# Patient Record
Sex: MALE | Race: WHITE | HISPANIC OR LATINO
[De-identification: names, ages, dates, MRNs, and addresses within clinical notes are randomized per-mention and may not be internally consistent; named-entity substitution may affect disease eponyms.]

---

## 2019-07-08 ENCOUNTER — APPOINTMENT (OUTPATIENT)
Dept: GASTROENTEROLOGY | Facility: CLINIC | Age: 49
End: 2019-07-08
Payer: COMMERCIAL

## 2019-07-08 VITALS
HEART RATE: 68 BPM | SYSTOLIC BLOOD PRESSURE: 130 MMHG | OXYGEN SATURATION: 96 % | WEIGHT: 315 LBS | TEMPERATURE: 98.6 F | BODY MASS INDEX: 47.74 KG/M2 | HEIGHT: 68 IN | DIASTOLIC BLOOD PRESSURE: 80 MMHG

## 2019-07-08 DIAGNOSIS — Z82.0 FAMILY HISTORY OF EPILEPSY AND OTHER DISEASES OF THE NERVOUS SYSTEM: ICD-10-CM

## 2019-07-08 DIAGNOSIS — Z87.442 PERSONAL HISTORY OF URINARY CALCULI: ICD-10-CM

## 2019-07-08 DIAGNOSIS — R10.2 PELVIC AND PERINEAL PAIN: ICD-10-CM

## 2019-07-08 DIAGNOSIS — Z82.49 FAMILY HISTORY OF ISCHEMIC HEART DISEASE AND OTHER DISEASES OF THE CIRCULATORY SYSTEM: ICD-10-CM

## 2019-07-08 DIAGNOSIS — Z83.3 FAMILY HISTORY OF DIABETES MELLITUS: ICD-10-CM

## 2019-07-08 DIAGNOSIS — I71.2 THORACIC AORTIC ANEURYSM, W/OUT RUPTURE: ICD-10-CM

## 2019-07-08 DIAGNOSIS — Z80.0 FAMILY HISTORY OF MALIGNANT NEOPLASM OF DIGESTIVE ORGANS: ICD-10-CM

## 2019-07-08 DIAGNOSIS — Z87.891 PERSONAL HISTORY OF NICOTINE DEPENDENCE: ICD-10-CM

## 2019-07-08 DIAGNOSIS — R19.5 OTHER FECAL ABNORMALITIES: ICD-10-CM

## 2019-07-08 PROCEDURE — 82270 OCCULT BLOOD FECES: CPT

## 2019-07-08 PROCEDURE — 99204 OFFICE O/P NEW MOD 45 MIN: CPT | Mod: 25

## 2019-07-08 RX ORDER — CALCIUM CARBONATE/VITAMIN D3 600 MG-10
TABLET ORAL
Refills: 0 | Status: ACTIVE | COMMUNITY

## 2019-07-08 NOTE — REVIEW OF SYSTEMS
[Recent Weight Loss (___ Lbs)] : recent [unfilled] ~Ulb weight loss [Shortness Of Breath] : shortness of breath [As Noted in HPI] : as noted in HPI [Negative] : Heme/Lymph

## 2019-07-08 NOTE — REASON FOR VISIT
[Initial Evaluation] : an initial evaluation [FreeTextEntry1] : h/o polyps, loose stools, family h/o colon cancer, pain in perineum

## 2019-07-08 NOTE — PHYSICAL EXAM
[General Appearance - Alert] : alert [General Appearance - In No Acute Distress] : in no acute distress [Neck Appearance] : the appearance of the neck was normal [Thyroid Diffuse Enlargement] : the thyroid was not enlarged [Neck Cervical Mass (___cm)] : no neck mass was observed [Jugular Venous Distention Increased] : there was no jugular-venous distention [Thyroid Nodule] : there were no palpable thyroid nodules [Heart Rate And Rhythm] : heart rate was normal and rhythm regular [Heart Sounds] : normal S1 and S2 [Auscultation Breath Sounds / Voice Sounds] : lungs were clear to auscultation bilaterally [Heart Sounds Gallop] : no gallops [Murmurs] : no murmurs [Bowel Sounds] : normal bowel sounds [Edema] : there was no peripheral edema [Heart Sounds Pericardial Friction Rub] : no pericardial rub [Abdomen Tenderness] : non-tender [] : no hepato-splenomegaly [Abdomen Soft] : soft [Normal Sphincter Tone] : normal sphincter tone [Abdomen Mass (___ Cm)] : no abdominal mass palpated [No Rectal Mass] : no rectal mass [No CVA Tenderness] : no ~M costovertebral angle tenderness [No Spinal Tenderness] : no spinal tenderness [Oriented To Time, Place, And Person] : oriented to person, place, and time [Impaired Insight] : insight and judgment were intact [Affect] : the affect was normal [Occult Blood Positive] : stool was negative for occult blood [FreeTextEntry1] : no perineal lesion/abscess noted

## 2019-07-08 NOTE — ASSESSMENT
[FreeTextEntry1] : Patient with a history of colonic polyps and a father with colon cancer. He has been having loose stools. He complains of pain in the paramedian although nothing is seen on physical exam.\par \par A colonoscopy has been scheduled. The risks, benefits, alternatives, and limitations of the procedure, including the possibility of missed lesions, were explained. The patient will require anesthesia evaluation prior to the procedure given his BMI of 50.18.

## 2019-07-08 NOTE — CONSULT LETTER
[FreeTextEntry1] : Dear Dr. Tate García,\par \par I had the pleasure of seeing your patient NEVA WOODY in the office today.  My office note is attached.\par \par Thank you very much for allowing me to participate in the care of your patient.\par \par Sincerely,\par \par Jose Oviedo M.D., FAC, FACP\par Director, Celiac Program at [facility]\par  of Medicine\par [employer] at [facility] Practice Director,\par [facility] - Gastroenterology/Internal Medicine at [place]\[facility] [address]\[place] [address]\par Tel: [phone]\par Email: [email or web address] \par \[facility] The attached note has been created using a voice recognition system (Dragon).  There may be some misspellings and typos.  Please call my office if you have any issues or questions.

## 2019-07-08 NOTE — HISTORY OF PRESENT ILLNESS
[FreeTextEntry1] : The patient is a 48-year-old man with a history of colonic polyps and a father with colon cancer. He has been diagnosed with IBS in the past. He states that for the past 2 weeks, he has had loose stools about twice a day. He denies melena or bright or blood per rectum. He denies abdominal pain. Of note, the patient has been on Nutrisystem for the past 2 months. He denies heartburn, dysphagia, nausea, or vomiting. His weight fluctuates having had lost 57 pounds and gaining 20 and now losing 5. The patient also complains of irritation in his perineum with swelling for which he uses Preparation H to control it. He also sees intermittent bleeding. The patient has not been hospitalized in the past year and denies any cardiac issues other than a sending aortic aneurysm for which he is followed regularly by cardiology and has been told is stable.

## 2019-07-11 ENCOUNTER — APPOINTMENT (OUTPATIENT)
Dept: GASTROENTEROLOGY | Facility: AMBULATORY MEDICAL SERVICES | Age: 49
End: 2019-07-11
Payer: COMMERCIAL

## 2019-07-11 PROCEDURE — 45380 COLONOSCOPY AND BIOPSY: CPT

## 2020-01-15 ENCOUNTER — OTHER (OUTPATIENT)
Age: 50
End: 2020-01-15

## 2020-09-08 ENCOUNTER — APPOINTMENT (OUTPATIENT)
Dept: GASTROENTEROLOGY | Facility: CLINIC | Age: 50
End: 2020-09-08
Payer: COMMERCIAL

## 2020-09-08 VITALS
HEIGHT: 68 IN | OXYGEN SATURATION: 97 % | SYSTOLIC BLOOD PRESSURE: 130 MMHG | WEIGHT: 315 LBS | DIASTOLIC BLOOD PRESSURE: 80 MMHG | HEART RATE: 65 BPM | TEMPERATURE: 96 F | BODY MASS INDEX: 47.74 KG/M2

## 2020-09-08 DIAGNOSIS — Z86.79 PERSONAL HISTORY OF OTHER DISEASES OF THE CIRCULATORY SYSTEM: ICD-10-CM

## 2020-09-08 DIAGNOSIS — E66.9 OBESITY, UNSPECIFIED: ICD-10-CM

## 2020-09-08 DIAGNOSIS — Z01.818 ENCOUNTER FOR OTHER PREPROCEDURAL EXAMINATION: ICD-10-CM

## 2020-09-08 PROCEDURE — 99214 OFFICE O/P EST MOD 30 MIN: CPT

## 2020-09-08 RX ORDER — MULTIVITAMIN
TABLET ORAL
Refills: 0 | Status: ACTIVE | COMMUNITY

## 2020-09-08 RX ORDER — ASPIRIN 81 MG/1
81 TABLET, COATED ORAL
Refills: 0 | Status: DISCONTINUED | COMMUNITY
End: 2020-09-08

## 2020-09-08 RX ORDER — SODIUM SULFATE, POTASSIUM SULFATE, MAGNESIUM SULFATE 17.5; 3.13; 1.6 G/ML; G/ML; G/ML
17.5-3.13-1.6 SOLUTION, CONCENTRATE ORAL
Qty: 1 | Refills: 0 | Status: DISCONTINUED | COMMUNITY
Start: 2019-07-08 | End: 2020-09-08

## 2020-09-08 NOTE — PHYSICAL EXAM
[General Appearance - Alert] : alert [Neck Cervical Mass (___cm)] : no neck mass was observed [Neck Appearance] : the appearance of the neck was normal [General Appearance - In No Acute Distress] : in no acute distress [Thyroid Nodule] : there were no palpable thyroid nodules [Jugular Venous Distention Increased] : there was no jugular-venous distention [Thyroid Diffuse Enlargement] : the thyroid was not enlarged [Auscultation Breath Sounds / Voice Sounds] : lungs were clear to auscultation bilaterally [Heart Sounds Gallop] : no gallops [Heart Sounds] : normal S1 and S2 [Heart Rate And Rhythm] : heart rate was normal and rhythm regular [Heart Sounds Pericardial Friction Rub] : no pericardial rub [Murmurs] : no murmurs [Edema] : there was no peripheral edema [Bowel Sounds] : normal bowel sounds [Abdomen Soft] : soft [Abdomen Tenderness] : non-tender [] : no hepato-splenomegaly [Abdomen Mass (___ Cm)] : no abdominal mass palpated [No CVA Tenderness] : no ~M costovertebral angle tenderness [Oriented To Time, Place, And Person] : oriented to person, place, and time [No Spinal Tenderness] : no spinal tenderness [Impaired Insight] : insight and judgment were intact [Affect] : the affect was normal [FreeTextEntry1] : obese

## 2020-09-08 NOTE — ASSESSMENT
[FreeTextEntry1] : Patient who is undergoing preoperative evaluation for bariatric surgery.  He will require an endoscopy.  Colonoscopy was significant for mild diverticulosis.\par \par An EGD has been scheduled. The risks, benefits, alternatives, and limitations of the procedure were explained.  The patient will require preoperative anesthesia evaluation due to his BMI of 54.13.\par \par Information was given to the patient regarding diverticulosis and a high-fiber diet.\par \par Patient is due for colonoscopy in July 2022 for his history of colonic polyps and father with colon cancer.\par \par \par Plan from 7/8/2019 - Patient with a history of colonic polyps and a father with colon cancer. He has been having loose stools. He complains of pain in the paramedian although nothing is seen on physical exam.\par \par A colonoscopy has been scheduled. The risks, benefits, alternatives, and limitations of the procedure, including the possibility of missed lesions, were explained. The patient will require anesthesia evaluation prior to the procedure given his BMI of 50.18.

## 2020-09-08 NOTE — CONSULT LETTER
[FreeTextEntry1] : Dear Dr. Tate García,\Banner \Banner I had the pleasure of seeing your patient NEVA WOODY in the office today.  My office note is attached. PLEASE READ THE "ASSESSMENT" SECTION OF THE NOTE TO SEE MY IMPRESSION AND PLAN.\par \Banner Thank you very much for allowing me to participate in the care of your patient.\Banner \Banner Sincerely,\Banner \Banner Jose Oviedo M.D., FAC, [facility]anner Director, Celiac Program at [facility]  of Medicine\[facility] [employer] at [facility] Practice Director,[facility] - Gastroenterology/Internal Medicine at [place]\[facility] [address]\[place], [place] [address] Tel: [phone]\Banner Email: [email or web address] \Banner \Banner The attached note has been created using a voice recognition system (Dragon).  There may be some misspellings and typos.  Please call my office if you have any issues or questions.

## 2020-09-08 NOTE — HISTORY OF PRESENT ILLNESS
[FreeTextEntry1] : The patient is seen for the first time since his colonoscopy performed on July 11, 2019.  We reviewed the procedure which was significant for nodular mucosa in the terminal ileum with negative biopsies.  The patient has mild sigmoid diverticulosis.  Random right and left colonic biopsies are negative.  The patient now presents because he is planning on going for bariatric surgery and needs a preoperative endoscopy.  He denies abdominal pain.  He gets infrequent heartburn, approximately once a month for which he takes occasional Tums.  He denies dysphasia.  He has 2 bowel movements a day which are mostly solid but sometimes soft.  He denies melena or bright red blood per rectum.  The patient has gained 26 pounds since July 2019.  His BMI is now 54.13.  The patient has not been admitted to the hospital in the past year and denies any cardiac issues.\par \par \par Note from 7/8/2019 - The patient is a 48-year-old man with a history of colonic polyps and a father with colon cancer. He has been diagnosed with IBS in the past. He states that for the past 2 weeks, he has had loose stools about twice a day. He denies melena or bright or blood per rectum. He denies abdominal pain. Of note, the patient has been on Nutrisystem for the past 2 months. He denies heartburn, dysphagia, nausea, or vomiting. His weight fluctuates having had lost 57 pounds and gaining 20 and now losing 5. The patient also complains of irritation in his perineum with swelling for which he uses Preparation H to control it. He also sees intermittent bleeding. The patient has not been hospitalized in the past year and denies any cardiac issues other than a sending aortic aneurysm for which he is followed regularly by cardiology and has been told is stable.

## 2020-09-18 ENCOUNTER — APPOINTMENT (OUTPATIENT)
Dept: DISASTER EMERGENCY | Facility: CLINIC | Age: 50
End: 2020-09-18

## 2020-09-18 ENCOUNTER — TRANSCRIPTION ENCOUNTER (OUTPATIENT)
Age: 50
End: 2020-09-18

## 2020-09-18 DIAGNOSIS — Z01.818 ENCOUNTER FOR OTHER PREPROCEDURAL EXAMINATION: ICD-10-CM

## 2020-09-19 LAB — SARS-COV-2 N GENE NPH QL NAA+PROBE: NOT DETECTED

## 2020-09-23 ENCOUNTER — APPOINTMENT (OUTPATIENT)
Dept: GASTROENTEROLOGY | Facility: AMBULATORY MEDICAL SERVICES | Age: 50
End: 2020-09-23
Payer: COMMERCIAL

## 2020-09-23 PROCEDURE — 43239 EGD BIOPSY SINGLE/MULTIPLE: CPT

## 2020-11-19 ENCOUNTER — APPOINTMENT (OUTPATIENT)
Dept: GASTROENTEROLOGY | Facility: CLINIC | Age: 50
End: 2020-11-19
Payer: COMMERCIAL

## 2020-11-19 DIAGNOSIS — K31.7 POLYP OF STOMACH AND DUODENUM: ICD-10-CM

## 2020-11-19 DIAGNOSIS — K21.00 GASTRO-ESOPHAGEAL REFLUX DISEASE WITH ESOPHAGITIS, WITHOUT BLEEDING: ICD-10-CM

## 2020-11-19 PROCEDURE — 99213 OFFICE O/P EST LOW 20 MIN: CPT | Mod: 95

## 2020-11-22 NOTE — CONSULT LETTER
[FreeTextEntry1] : Dear Dr. Tate García and Dr. Jose Pocne,\par \par I had the pleasure of seeing your patient NEVA WOODY in the office today.  My office note is attached. PLEASE READ THE "ASSESSMENT" SECTION OF THE NOTE TO SEE MY IMPRESSION AND PLAN.\par \par Thank you very much for allowing me to participate in the care of your patient.\par \par Sincerely,\par \par Jose Oviedo M.D., FACG, FACP\par Director, Celiac Program at [facility]  of Medicine\par [employer] at [facility] [place] Practice Director,\[facility] - Gastroenterology/Internal Medicine at [place]\[address]\[place] [address]\par Tel: [phone]\par Email: [email or web address]\par \par \[facility] The attached note has been created using a voice recognition system (Dragon).  There may be some misspellings and typos.  Please call my office if you have any issues or questions.

## 2020-11-22 NOTE — ASSESSMENT
[FreeTextEntry1] : Patient with benign gastric polyps and biopsy evidence of reflux esophagitis on endoscopy.  He only has rare heartburn but otherwise feels well.\par \par I discussed dietary and lifestyle modifications in the treatment of reflux.  The patient was also advised that he can use Tums as needed.\par \par There is no GI contraindication to bariatric surgery.\par \par Patient is due for colonoscopy in July 2022 for his history of adenomatous colonic polyps and a father with colon cancer.\par \par \par Plan from 9/8/2020 - Patient who is undergoing preoperative evaluation for bariatric surgery.  He will require an endoscopy.  Colonoscopy was significant for mild diverticulosis.\par \par An EGD has been scheduled. The risks, benefits, alternatives, and limitations of the procedure were explained.  The patient will require preoperative anesthesia evaluation due to his BMI of 54.13.\par \par Information was given to the patient regarding diverticulosis and a high-fiber diet.\par \par Patient is due for colonoscopy in July 2022 for his history of colonic polyps and father with colon cancer.\par \par \par Plan from 7/8/2019 - Patient with a history of colonic polyps and a father with colon cancer. He has been having loose stools. He complains of pain in the paramedian although nothing is seen on physical exam.\par \par A colonoscopy has been scheduled. The risks, benefits, alternatives, and limitations of the procedure, including the possibility of missed lesions, were explained. The patient will require anesthesia evaluation prior to the procedure given his BMI of 50.18.

## 2020-11-22 NOTE — HISTORY OF PRESENT ILLNESS
[Home] : at home, [unfilled] , at the time of the visit. [Medical Office: ([facility])___] : at the medical office located in  [Verbal consent obtained from patient] : the patient, [unfilled] [FreeTextEntry1] : We reviewed the patient's EGD performed on September 23, 2020.  The exam was significant for benign fundic gland polyps in the gastric body along with biopsy showing evidence of reflux esophagitis.  The patient gets rare heartburn sometimes at night.  He denies abdominal pain.  Bowel movements are normal with no melena or bright red blood per rectum.  He has bariatric surgery scheduled for December 28.\par \par \par Note from 9/8/2020 - The patient is seen for the first time since his colonoscopy performed on July 11, 2019.  We reviewed the procedure which was significant for nodular mucosa in the terminal ileum with negative biopsies.  The patient has mild sigmoid diverticulosis.  Random right and left colonic biopsies are negative.  The patient now presents because he is planning on going for bariatric surgery and needs a preoperative endoscopy.  He denies abdominal pain.  He gets infrequent heartburn, approximately once a month for which he takes occasional Tums.  He denies dysphasia.  He has 2 bowel movements a day which are mostly solid but sometimes soft.  He denies melena or bright red blood per rectum.  The patient has gained 26 pounds since July 2019.  His BMI is now 54.13.  The patient has not been admitted to the hospital in the past year and denies any cardiac issues.\par \par \par Note from 7/8/2019 - The patient is a 48-year-old man with a history of colonic polyps and a father with colon cancer. He has been diagnosed with IBS in the past. He states that for the past 2 weeks, he has had loose stools about twice a day. He denies melena or bright or blood per rectum. He denies abdominal pain. Of note, the patient has been on Nutrisystem for the past 2 months. He denies heartburn, dysphagia, nausea, or vomiting. His weight fluctuates having had lost 57 pounds and gaining 20 and now losing 5. The patient also complains of irritation in his perineum with swelling for which he uses Preparation H to control it. He also sees intermittent bleeding. The patient has not been hospitalized in the past year and denies any cardiac issues other than a sending aortic aneurysm for which he is followed regularly by cardiology and has been told is stable.

## 2021-10-08 ENCOUNTER — TRANSCRIPTION ENCOUNTER (OUTPATIENT)
Age: 51
End: 2021-10-08

## 2021-10-28 ENCOUNTER — TRANSCRIPTION ENCOUNTER (OUTPATIENT)
Age: 51
End: 2021-10-28

## 2022-02-23 ENCOUNTER — TRANSCRIPTION ENCOUNTER (OUTPATIENT)
Age: 52
End: 2022-02-23

## 2022-03-10 ENCOUNTER — TRANSCRIPTION ENCOUNTER (OUTPATIENT)
Age: 52
End: 2022-03-10

## 2022-08-04 ENCOUNTER — APPOINTMENT (OUTPATIENT)
Dept: GASTROENTEROLOGY | Facility: CLINIC | Age: 52
End: 2022-08-04

## 2022-08-04 VITALS
DIASTOLIC BLOOD PRESSURE: 78 MMHG | WEIGHT: 279.2 LBS | HEIGHT: 68 IN | BODY MASS INDEX: 42.31 KG/M2 | SYSTOLIC BLOOD PRESSURE: 100 MMHG | HEART RATE: 60 BPM | OXYGEN SATURATION: 96 % | TEMPERATURE: 96.9 F

## 2022-08-04 DIAGNOSIS — R14.3 FLATULENCE: ICD-10-CM

## 2022-08-04 PROCEDURE — 99213 OFFICE O/P EST LOW 20 MIN: CPT

## 2022-08-04 RX ORDER — ASPIRIN 81 MG
81 TABLET, DELAYED RELEASE (ENTERIC COATED) ORAL
Refills: 0 | Status: DISCONTINUED | COMMUNITY
End: 2022-08-04

## 2022-08-04 RX ORDER — SERTRALINE HYDROCHLORIDE 100 MG/1
100 TABLET, FILM COATED ORAL
Qty: 90 | Refills: 0 | Status: ACTIVE | COMMUNITY
Start: 2022-02-14

## 2022-08-04 RX ORDER — SERTRALINE HYDROCHLORIDE 25 MG/1
TABLET, FILM COATED ORAL
Refills: 0 | Status: DISCONTINUED | COMMUNITY
End: 2022-08-04

## 2022-08-04 RX ORDER — LOSARTAN POTASSIUM 50 MG/1
50 TABLET, FILM COATED ORAL
Qty: 90 | Refills: 0 | Status: ACTIVE | COMMUNITY
Start: 2022-01-10

## 2022-08-04 RX ORDER — VALSARTAN 40 MG/1
TABLET, COATED ORAL
Refills: 0 | Status: DISCONTINUED | COMMUNITY
End: 2022-08-04

## 2022-08-04 RX ORDER — FENOFIBRATE 145 MG/1
145 TABLET, COATED ORAL
Refills: 0 | Status: DISCONTINUED | COMMUNITY
End: 2022-08-04

## 2022-08-04 RX ORDER — ROSUVASTATIN CALCIUM 5 MG/1
5 TABLET, FILM COATED ORAL
Qty: 90 | Refills: 0 | Status: ACTIVE | COMMUNITY
Start: 2021-10-08

## 2022-08-04 RX ORDER — MULTIVIT-MIN/IRON/FOLIC ACID/K 18-600-40
500 CAPSULE ORAL
Refills: 0 | Status: ACTIVE | COMMUNITY

## 2022-08-04 RX ORDER — SODIUM SULFATE, POTASSIUM SULFATE, MAGNESIUM SULFATE 17.5; 3.13; 1.6 G/ML; G/ML; G/ML
17.5-3.13-1.6 SOLUTION, CONCENTRATE ORAL
Qty: 1 | Refills: 0 | Status: ACTIVE | COMMUNITY
Start: 2022-08-04 | End: 1900-01-01

## 2022-08-07 PROBLEM — R14.3 EXCESSIVE GAS: Status: ACTIVE | Noted: 2022-08-07

## 2022-08-07 NOTE — CONSULT LETTER
IVIG [FreeTextEntry1] : Dear Dr. Tate García,\par \par I had the pleasure of seeing your patient NEVA WOODY in the office today.  My office note is attached. PLEASE READ THE "ASSESSMENT" SECTION OF THE NOTE TO SEE MY IMPRESSION AND PLAN.\par \par Thank you very much for allowing me to participate in the care of your patient.\par \par Sincerely,\par \par Jose Oviedo M.D., FAC, FACP\par Director, Celiac Program at [facility]\par  of Medicine, [facility] [employer] at [facility] Adjunct  of Medicine, [facility] of Medicine\[facility] Practice Director, [facility] - Gastroenterology at [facility] [address]\[place] [address]\par Tel: [phone]\par Email: [email or web address]\par \par \par The attached note has been created using a voice recognition system (Dragon).  There may be some misspellings and typos.  Please call my office if you have any issues or questions.

## 2022-08-07 NOTE — HISTORY OF PRESENT ILLNESS
[FreeTextEntry1] : The patient has a history of adenomatous colonic polyps and a father with colon cancer.  Since he last saw me in November 2020, the patient underwent gastric bypass surgery on January 14, 2021.  He has lost 88 pounds since the surgery.  He feels well denying heartburn, dysphagia, abdominal pain.  He does complain of excessive gas.  He has 2 solid bowel movements a day without melena or bright red blood per rectum.  The patient's weight has stabilized recently.  He has an ascending aortic aneurysm which is stable in size and for which she is followed by Dr. Meredith.  The patient has not been admitted to the hospital in the past year and denies any cardiac issues.\par \par \par Note from 11/19/2020 - We reviewed the patient's EGD performed on September 23, 2020.  The exam was significant for benign fundic gland polyps in the gastric body along with biopsy showing evidence of reflux esophagitis.  The patient gets rare heartburn sometimes at night.  He denies abdominal pain.  Bowel movements are normal with no melena or bright red blood per rectum.  He has bariatric surgery scheduled for December 28.\par \par \par Note from 9/8/2020 - The patient is seen for the first time since his colonoscopy performed on July 11, 2019.  We reviewed the procedure which was significant for nodular mucosa in the terminal ileum with negative biopsies.  The patient has mild sigmoid diverticulosis.  Random right and left colonic biopsies are negative.  The patient now presents because he is planning on going for bariatric surgery and needs a preoperative endoscopy.  He denies abdominal pain.  He gets infrequent heartburn, approximately once a month for which he takes occasional Tums.  He denies dysphasia.  He has 2 bowel movements a day which are mostly solid but sometimes soft.  He denies melena or bright red blood per rectum.  The patient has gained 26 pounds since July 2019.  His BMI is now 54.13.  The patient has not been admitted to the hospital in the past year and denies any cardiac issues.\par \par \par Note from 7/8/2019 - The patient is a 48-year-old man with a history of colonic polyps and a father with colon cancer. He has been diagnosed with IBS in the past. He states that for the past 2 weeks, he has had loose stools about twice a day. He denies melena or bright or blood per rectum. He denies abdominal pain. Of note, the patient has been on Nutrisystem for the past 2 months. He denies heartburn, dysphagia, nausea, or vomiting. His weight fluctuates having had lost 57 pounds and gaining 20 and now losing 5. The patient also complains of irritation in his perineum with swelling for which he uses Preparation H to control it. He also sees intermittent bleeding. The patient has not been hospitalized in the past year and denies any cardiac issues other than a sending aortic aneurysm for which he is followed regularly by cardiology and has been told is stable.

## 2022-08-07 NOTE — REASON FOR VISIT
[Spouse] : spouse [FreeTextEntry1] : History of adenomatous colonic polyps, family history of colon cancer, excessive gas

## 2022-08-07 NOTE — ASSESSMENT
[FreeTextEntry1] : Patient with a history of adenomatous colonic polyps and a father with colon cancer.  He is doing well after gastric bypass surgery.\par \par A colonoscopy has been scheduled. The risks, benefits, alternatives, and limitations of the procedure, including the possibility of missed lesions, were explained.  The patient will require anesthesia evaluation given his BMI of 42.45.\par \par \par Plan from 11/19/2020 - Patient with benign gastric polyps and biopsy evidence of reflux esophagitis on endoscopy.  He only has rare heartburn but otherwise feels well.\par \par I discussed dietary and lifestyle modifications in the treatment of reflux.  The patient was also advised that he can use Tums as needed.\par \par There is no GI contraindication to bariatric surgery.\par \par Patient is due for colonoscopy in July 2022 for his history of adenomatous colonic polyps and a father with colon cancer.\par \par \par Plan from 9/8/2020 - Patient who is undergoing preoperative evaluation for bariatric surgery.  He will require an endoscopy.  Colonoscopy was significant for mild diverticulosis.\par \par An EGD has been scheduled. The risks, benefits, alternatives, and limitations of the procedure were explained.  The patient will require preoperative anesthesia evaluation due to his BMI of 54.13.\par \par Information was given to the patient regarding diverticulosis and a high-fiber diet.\par \par Patient is due for colonoscopy in July 2022 for his history of colonic polyps and father with colon cancer.\par \par \par Plan from 7/8/2019 - Patient with a history of colonic polyps and a father with colon cancer. He has been having loose stools. He complains of pain in the paramedian although nothing is seen on physical exam.\par \par A colonoscopy has been scheduled. The risks, benefits, alternatives, and limitations of the procedure, including the possibility of missed lesions, were explained. The patient will require anesthesia evaluation prior to the procedure given his BMI of 50.18.

## 2022-11-14 DIAGNOSIS — Z20.822 ENCOUNTER FOR PREPROCEDURAL LABORATORY EXAMINATION: ICD-10-CM

## 2022-11-14 DIAGNOSIS — Z01.812 ENCOUNTER FOR PREPROCEDURAL LABORATORY EXAMINATION: ICD-10-CM

## 2022-12-04 ENCOUNTER — NON-APPOINTMENT (OUTPATIENT)
Age: 52
End: 2022-12-04

## 2022-12-06 ENCOUNTER — NON-APPOINTMENT (OUTPATIENT)
Age: 52
End: 2022-12-06

## 2022-12-09 ENCOUNTER — APPOINTMENT (OUTPATIENT)
Dept: GASTROENTEROLOGY | Facility: AMBULATORY MEDICAL SERVICES | Age: 52
End: 2022-12-09

## 2022-12-09 PROCEDURE — 45380 COLONOSCOPY AND BIOPSY: CPT | Mod: 33

## 2022-12-27 ENCOUNTER — NON-APPOINTMENT (OUTPATIENT)
Age: 52
End: 2022-12-27

## 2023-02-02 ENCOUNTER — APPOINTMENT (OUTPATIENT)
Dept: GASTROENTEROLOGY | Facility: CLINIC | Age: 53
End: 2023-02-02

## 2023-02-06 ENCOUNTER — APPOINTMENT (OUTPATIENT)
Dept: GASTROENTEROLOGY | Facility: CLINIC | Age: 53
End: 2023-02-06
Payer: COMMERCIAL

## 2023-02-06 DIAGNOSIS — K64.4 RESIDUAL HEMORRHOIDAL SKIN TAGS: ICD-10-CM

## 2023-02-06 DIAGNOSIS — Z86.010 PERSONAL HISTORY OF COLONIC POLYPS: ICD-10-CM

## 2023-02-06 DIAGNOSIS — K57.30 DIVERTICULOSIS OF LARGE INTESTINE W/OUT PERFORATION OR ABSCESS W/OUT BLEEDING: ICD-10-CM

## 2023-02-06 PROCEDURE — 99213 OFFICE O/P EST LOW 20 MIN: CPT | Mod: 95

## 2023-02-08 NOTE — HISTORY OF PRESENT ILLNESS
[Home] : at home, [unfilled] , at the time of the visit. [Medical Office: ([facility])___] : at the medical office located in  [Verbal consent obtained from patient] : the patient, [unfilled] [FreeTextEntry1] : Reviewed the patient's colonoscopy performed in December 9, 2022.  A hyperplastic polyp was removed from the sigmoid colon.  The patient also has moderate sigmoid diverticulosis as well as external hemorrhoids which are generally asymptomatic.  The patient denies abdominal pain.  Bowel movements are normal without melena or bright red blood per rectum.  He denies heartburn or dysphagia.  The patient has a history of adenomatous colonic polyps and a father with colon cancer.\par \par \par Note from 8/4/2022 - The patient has a history of adenomatous colonic polyps and a father with colon cancer.  Since he last saw me in November 2020, the patient underwent gastric bypass surgery on January 14, 2021.  He has lost 88 pounds since the surgery.  He feels well denying heartburn, dysphagia, abdominal pain.  He does complain of excessive gas.  He has 2 solid bowel movements a day without melena or bright red blood per rectum.  The patient's weight has stabilized recently.  He has an ascending aortic aneurysm which is stable in size and for which she is followed by Dr. Meredith.  The patient has not been admitted to the hospital in the past year and denies any cardiac issues.\par \par \par Note from 11/19/2020 - We reviewed the patient's EGD performed on September 23, 2020.  The exam was significant for benign fundic gland polyps in the gastric body along with biopsy showing evidence of reflux esophagitis.  The patient gets rare heartburn sometimes at night.  He denies abdominal pain.  Bowel movements are normal with no melena or bright red blood per rectum.  He has bariatric surgery scheduled for December 28.\par \par \par Note from 9/8/2020 - The patient is seen for the first time since his colonoscopy performed on July 11, 2019.  We reviewed the procedure which was significant for nodular mucosa in the terminal ileum with negative biopsies.  The patient has mild sigmoid diverticulosis.  Random right and left colonic biopsies are negative.  The patient now presents because he is planning on going for bariatric surgery and needs a preoperative endoscopy.  He denies abdominal pain.  He gets infrequent heartburn, approximately once a month for which he takes occasional Tums.  He denies dysphasia.  He has 2 bowel movements a day which are mostly solid but sometimes soft.  He denies melena or bright red blood per rectum.  The patient has gained 26 pounds since July 2019.  His BMI is now 54.13.  The patient has not been admitted to the hospital in the past year and denies any cardiac issues.\par \par \par Note from 7/8/2019 - The patient is a 48-year-old man with a history of colonic polyps and a father with colon cancer. He has been diagnosed with IBS in the past. He states that for the past 2 weeks, he has had loose stools about twice a day. He denies melena or bright or blood per rectum. He denies abdominal pain. Of note, the patient has been on Nutrisystem for the past 2 months. He denies heartburn, dysphagia, nausea, or vomiting. His weight fluctuates having had lost 57 pounds and gaining 20 and now losing 5. The patient also complains of irritation in his perineum with swelling for which he uses Preparation H to control it. He also sees intermittent bleeding. The patient has not been hospitalized in the past year and denies any cardiac issues other than a sending aortic aneurysm for which he is followed regularly by cardiology and has been told is stable.

## 2023-02-08 NOTE — ASSESSMENT
[FreeTextEntry1] : Patient with a history of adenomatous colonic polyps and a father with colon cancer.  He has moderate sigmoid diverticulosis.\par \par We discussed the importance of a high-fiber diet.\par \par We will repeat a colonoscopy in 3 years that is December 2025.\par \par \par Plan from 8/4/2022 - Patient with a history of adenomatous colonic polyps and a father with colon cancer.  He is doing well after gastric bypass surgery.\par \par A colonoscopy has been scheduled. The risks, benefits, alternatives, and limitations of the procedure, including the possibility of missed lesions, were explained.  The patient will require anesthesia evaluation given his BMI of 42.45.\par \par \par Plan from 11/19/2020 - Patient with benign gastric polyps and biopsy evidence of reflux esophagitis on endoscopy.  He only has rare heartburn but otherwise feels well.\par \par I discussed dietary and lifestyle modifications in the treatment of reflux.  The patient was also advised that he can use Tums as needed.\par \par There is no GI contraindication to bariatric surgery.\par \par Patient is due for colonoscopy in July 2022 for his history of adenomatous colonic polyps and a father with colon cancer.\par \par \par Plan from 9/8/2020 - Patient who is undergoing preoperative evaluation for bariatric surgery.  He will require an endoscopy.  Colonoscopy was significant for mild diverticulosis.\par \par An EGD has been scheduled. The risks, benefits, alternatives, and limitations of the procedure were explained.  The patient will require preoperative anesthesia evaluation due to his BMI of 54.13.\par \par Information was given to the patient regarding diverticulosis and a high-fiber diet.\par \par Patient is due for colonoscopy in July 2022 for his history of colonic polyps and father with colon cancer.\par \par \par Plan from 7/8/2019 - Patient with a history of colonic polyps and a father with colon cancer. He has been having loose stools. He complains of pain in the paramedian although nothing is seen on physical exam.\par \par A colonoscopy has been scheduled. The risks, benefits, alternatives, and limitations of the procedure, including the possibility of missed lesions, were explained. The patient will require anesthesia evaluation prior to the procedure given his BMI of 50.18.

## 2023-02-08 NOTE — REASON FOR VISIT
[FreeTextEntry1] : History of adenomatous colonic polyps, diverticulosis, hemorrhoids, family history of colon cancer

## 2023-02-08 NOTE — CONSULT LETTER
[FreeTextEntry1] : Dear Dr. Tate García,\par \par I had the pleasure of seeing your patient NEVA WOODY in the office today.  My office note is attached. PLEASE READ THE "ASSESSMENT" SECTION OF THE NOTE TO SEE MY IMPRESSION AND PLAN.\par \par Thank you very much for allowing me to participate in the care of your patient.\par \par Sincerely,\par \par Jose Oviedo M.D., FAC, FACP\par Director, Celiac Program at [facility]\par  of Medicine, [employer] at [facility] Adjunct  of Medicine, [facility] [employer] [facility] Practice Director, [facility] - Gastroenterology at [facility] [address]\[place] [address]\par Tel: [phone]\par Email: [email or web address]\par \par \par The attached note has been created using a voice recognition system (Dragon).  There may be some misspellings and typos.  Please call my office if you have any issues or questions.

## 2023-04-11 ENCOUNTER — APPOINTMENT (OUTPATIENT)
Dept: ORTHOPEDIC SURGERY | Facility: CLINIC | Age: 53
End: 2023-04-11

## 2023-04-14 ENCOUNTER — APPOINTMENT (OUTPATIENT)
Dept: ORTHOPEDIC SURGERY | Facility: CLINIC | Age: 53
End: 2023-04-14
Payer: COMMERCIAL

## 2023-04-14 DIAGNOSIS — M19.041 PRIMARY OSTEOARTHRITIS, RIGHT HAND: ICD-10-CM

## 2023-04-14 PROCEDURE — 73140 X-RAY EXAM OF FINGER(S): CPT | Mod: RT

## 2023-04-14 PROCEDURE — 99213 OFFICE O/P EST LOW 20 MIN: CPT

## 2023-04-14 NOTE — HISTORY OF PRESENT ILLNESS
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 Pain if he bangs it.\par Denies recent injury, but reports remote hx martial arts and power lifting.\par Using Ibuprofen PRN.\par \par Hx: ascending aorta aneurysm. HLD. depression. IBS.\par s/p gastric bypass. [] : no [FreeTextEntry1] : right index  [FreeTextEntry5] : NEVABIGG WELLSMAN wen [RHD] 52 year old male is here today complaining of right index finger pain. onset of pain gradually increasing recently. pt states he was involved in martial arts in the past, punching boards. he is working in IT, daily typing exacerbates pain.  [de-identified] : pressure

## 2023-04-14 NOTE — IMAGING
[de-identified] : RIGHT HAND\par skin intact. no swelling.\par TTP to IF MPJ.\par good EPL, FPL. good finger extension, flex to full fist. good finger abduction and adduction. \par SILT to median, ulnar, radial distribution. \par brisk cap refill all digits.\par no triggering.\par \par IF MPJ: + pain with stress of RCL/UCL. good endpoint.\par \par \par XRAYS OF RIGHT INDEX FINGER: no acute displaced fracture or dislocation. djd of PIPJ.

## 2023-04-14 NOTE — ASSESSMENT
[FreeTextEntry1] : The condition was explained to the patient.\par Discussed that arthritis is a progressive degenerative process, and symptoms may have a waxing/waning course, which may be exacerbated by activity or trauma. Discussed treatment options - activity modification, bracing, steroid injection, or last resort surgery.\par - applied vee loops to IF/MF PRN painful activity.\par - recommend Voltaren gel PRN. reviewed contra-indicated medical conditions (eg liver disease, kidney disease) or medications (eg blood thinners).\par \par F/u PRN.

## 2023-08-15 ENCOUNTER — APPOINTMENT (OUTPATIENT)
Dept: ORTHOPEDIC SURGERY | Facility: CLINIC | Age: 53
End: 2023-08-15

## 2023-08-21 ENCOUNTER — APPOINTMENT (OUTPATIENT)
Dept: ORTHOPEDIC SURGERY | Facility: CLINIC | Age: 53
End: 2023-08-21
Payer: COMMERCIAL

## 2023-08-21 VITALS — HEIGHT: 68 IN | BODY MASS INDEX: 43.19 KG/M2 | WEIGHT: 285 LBS

## 2023-08-21 DIAGNOSIS — M75.41 IMPINGEMENT SYNDROME OF RIGHT SHOULDER: ICD-10-CM

## 2023-08-21 DIAGNOSIS — M19.011 PRIMARY OSTEOARTHRITIS, RIGHT SHOULDER: ICD-10-CM

## 2023-08-21 PROCEDURE — 73010 X-RAY EXAM OF SHOULDER BLADE: CPT | Mod: RT

## 2023-08-21 PROCEDURE — 20611 DRAIN/INJ JOINT/BURSA W/US: CPT | Mod: RT

## 2023-08-21 PROCEDURE — 73030 X-RAY EXAM OF SHOULDER: CPT | Mod: RT

## 2023-08-21 PROCEDURE — J3490M: CUSTOM

## 2023-08-21 PROCEDURE — 99214 OFFICE O/P EST MOD 30 MIN: CPT | Mod: 25

## 2023-08-21 NOTE — IMAGING
[de-identified] :  RIGHT SHOULDER Inspection: No swelling.  Palpation: Tenderness is noted at the bicipital groove, anterior and lateral.  Range of motion: There is pain with range of motion. , ER 55, @90ER 90, @90IR 30 Strength: There is pain and discomfort with strength testing. Forward Flexion 4/5. Abduction 4/5.  External Rotation 5-/5 and Internal Rotation 5/5  Neurological testings: motor and sensor intact distally. Ligament Stability and Special Tests:  There is positive arc of pain.  Shoulder apprehension: neg Shoulder relocation: neg Obriens test: pos Biceps Active test: neg Man Labral Shear: neg Impingement testing: pos Flor testing: pos Whipple: pos Cross Body Adduction: neg

## 2023-08-21 NOTE — ASSESSMENT
[FreeTextEntry1] : Right X-Ray Examination of the SHOULDER (2 views):  No fractures, subluxations or dislocations. GH degenerative changes.  X-Ray Examination of the SCAPULA 1 or 2 views shows: No significant abnormalities.  Acromial spur.   - We discussed their diagnosis and treatment options at length including the risks and benefits of both surgical and non-surgical options. - We will continue conservative treatment with a course of PT, icing, and anti-inflammatory medication. - The patient was provided with a prescription to work on scapular strengthening and rotator cuff strengthening. - The patient was advised to let pain guide the gradual advancement of activities.  - We also discussed the possible of a corticosteroid injection in order to help decrease inflammation and pain so that they can perform better therapy. - The risks, benefits, and alternatives to corticosteroid injection were reviewed with the patient and they wished to proceed with this treatment course.  - Follow up as needed in 6 weeks to re-evaluate progress with therapy

## 2023-08-21 NOTE — HISTORY OF PRESENT ILLNESS
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 The pain is located  lateral, anterior  The pain is associated with weakness   Worse with increased activity and better at rest. Has tried act modification, moist heat **h/o aortic anuesym - avoids nsaids**

## 2023-09-22 NOTE — END OF VISIT
Pt's sister states she will try to get his labs done today but he has a psychiatrist appt and she does not know if she will have time, if not she said she will take him Monday morning for labs. I explained the urgency of getting labs done today so we will not risk altering the date of his upcoming procedure. She verbalized understanding.    [Time Spent: ___ minutes] : I have spent [unfilled] minutes of time on the encounter. [>50% of the face to face encounter time was spent on counseling and/or coordination of care for ___] : Greater than 50% of the face to face encounter time was spent on counseling and/or coordination of care for [unfilled]

## 2024-09-09 ENCOUNTER — APPOINTMENT (OUTPATIENT)
Dept: ORTHOPEDIC SURGERY | Facility: CLINIC | Age: 54
End: 2024-09-09
Payer: COMMERCIAL

## 2024-09-09 VITALS — WEIGHT: 290 LBS | BODY MASS INDEX: 43.95 KG/M2 | HEIGHT: 68 IN

## 2024-09-09 DIAGNOSIS — M93.272 OSTEOCHONDRITIS DISSECANS, LEFT ANKLE AND JOINTS OF LEFT FOOT: ICD-10-CM

## 2024-09-09 DIAGNOSIS — M25.872 OTHER SPECIFIED JOINT DISORDERS, LEFT ANKLE AND FOOT: ICD-10-CM

## 2024-09-09 PROCEDURE — 73600 X-RAY EXAM OF ANKLE: CPT | Mod: LT

## 2024-09-09 PROCEDURE — L4350: CPT | Mod: LT

## 2024-09-09 PROCEDURE — 99204 OFFICE O/P NEW MOD 45 MIN: CPT | Mod: 25

## 2024-09-09 PROCEDURE — 73630 X-RAY EXAM OF FOOT: CPT | Mod: LT

## 2024-09-09 NOTE — PHYSICAL EXAM
[Left] : left foot and ankle [Mild] : mild diffused ankle swelling [NL (40)] : plantar flexion 40 degrees [NL 30)] : inversion 30 degrees [NL (20)] : eversion 20 degrees [5___] : eversion 5[unfilled]/5 [2+] : dorsalis pedis pulse: 2+ [] : patient ambulates without assistive device

## 2024-09-12 ENCOUNTER — APPOINTMENT (OUTPATIENT)
Dept: MRI IMAGING | Facility: CLINIC | Age: 54
End: 2024-09-12
Payer: COMMERCIAL

## 2024-09-12 PROCEDURE — 73721 MRI JNT OF LWR EXTRE W/O DYE: CPT | Mod: LT

## 2024-09-23 ENCOUNTER — APPOINTMENT (OUTPATIENT)
Dept: ORTHOPEDIC SURGERY | Facility: CLINIC | Age: 54
End: 2024-09-23
Payer: COMMERCIAL

## 2024-09-23 DIAGNOSIS — M25.872 OTHER SPECIFIED JOINT DISORDERS, LEFT ANKLE AND FOOT: ICD-10-CM

## 2024-09-23 PROCEDURE — 20605 DRAIN/INJ JOINT/BURSA W/O US: CPT | Mod: LT

## 2024-09-23 PROCEDURE — L4350: CPT | Mod: LT

## 2024-09-23 PROCEDURE — 99214 OFFICE O/P EST MOD 30 MIN: CPT | Mod: 25

## 2024-09-23 NOTE — ASSESSMENT
[FreeTextEntry1] : [employer] discussed weight loss ice/elevate csi sinus tarsi today nsaids prn f/up 2-3 months

## 2024-09-23 NOTE — HISTORY OF PRESENT ILLNESS
[7] : 7 [2] : 2 [Standing] : standing [Walking] : walking [de-identified] : 09/09/2024: 2 years chronic ankle pain. states worsening recently after doing a lot of walking on vacation. denies n/t. no recently treatment. h/o prior fx of mt tx w/o surgery. denies dm/tob. . c/o ongoing popping/locking in ankle  09/23/2024: MRI f/up. worse after standing for a long time at a political rally w/o brace on.  [] : no [FreeTextEntry1] : left foot [de-identified] : STAN [de-identified] : Xray

## 2024-09-23 NOTE — IMAGING
[Left] : left foot [FreeTextEntry9] : ant impingement; ? lateral foot ocd [de-identified] : ant impingement; ? lateral foot ocd

## 2024-09-23 NOTE — DATA REVIEWED
[MRI] : MRI [Left] : left [Ankle] : ankle [I reviewed the films/CD and additionally noted] : I reviewed the films/CD and additionally noted [FreeTextEntry1] : ankle and subtalar oa

## 2024-09-23 NOTE — PROCEDURE
[Medium Joint Injection] : medium joint injection [Left] : of the left [Sinus Tarsi] : sinus tarsi [Pain] : pain [Inflammation] : inflammation [X-ray evidence of Osteoarthritis on this or prior visit] : x-ray evidence of Osteoarthritis on this or prior visit [Betadine] : betadine [Ethyl Chloride sprayed topically] : ethyl chloride sprayed topically [Sterile technique used] : sterile technique used [___ cc    3mg] :  Betamethasone (Celestone) ~Vcc of 3mg [___ cc    1%] : Lidocaine ~Vcc of 1%

## 2024-09-27 ENCOUNTER — APPOINTMENT (OUTPATIENT)
Dept: ORTHOPEDIC SURGERY | Facility: CLINIC | Age: 54
End: 2024-09-27
Payer: COMMERCIAL

## 2024-09-27 VITALS — BODY MASS INDEX: 44.71 KG/M2 | WEIGHT: 295 LBS | HEIGHT: 68 IN

## 2024-09-27 DIAGNOSIS — M17.11 UNILATERAL PRIMARY OSTEOARTHRITIS, RIGHT KNEE: ICD-10-CM

## 2024-09-27 DIAGNOSIS — M25.561 PAIN IN RIGHT KNEE: ICD-10-CM

## 2024-09-27 DIAGNOSIS — M25.562 PAIN IN RIGHT KNEE: ICD-10-CM

## 2024-09-27 DIAGNOSIS — M17.12 UNILATERAL PRIMARY OSTEOARTHRITIS, LEFT KNEE: ICD-10-CM

## 2024-09-27 PROCEDURE — 73562 X-RAY EXAM OF KNEE 3: CPT | Mod: 50

## 2024-09-27 PROCEDURE — 99214 OFFICE O/P EST MOD 30 MIN: CPT

## 2024-09-27 NOTE — HISTORY OF PRESENT ILLNESS
[Full time] : Work status: full time [de-identified] : 09/27/2024: 52yo male presenting with RONALDO knee pain. No known injury/fall. Had RT meniscus tear in 2009- RT arthroscopy done. Reports occasional sharp pain and inflammation RT knee. Also, intermittent swelling and pain of LT knee. No recent treatment.  [FreeTextEntry1] : RONALDO knees  [de-identified] :

## 2024-09-27 NOTE — ASSESSMENT
[FreeTextEntry1] : chronic right knee pain worsening since earl september 2024.  no injury or trauma.  history of right knee scope in 2009 done by outside ortho doc.  right knee worse than left knee. moderate to severe oa present.  history of gastric bypass - cautious with nsaids history of thyroid cancer s/p thyroidectomy pmhx - high cholesterol  .

## 2024-09-27 NOTE — DISCUSSION/SUMMARY
[de-identified] : HEP and PT.  Cannot take nsaids.  Had gastric bypass surgery. The patient's orthopaedic condition(s) would warrant consideration of consistent or intermittent use of a prescription strength non-steroidal anti-inflammatory medication.  These medications are associated with risks including but not limited to gastrointestinal irritation, kidney damage, hypertension, and bleeding.    The patient has one or more medical conditions that would make this class of medication a significant risk and was therefore not prescribed.  Follow up 6 weeks.  Will consider visco if pain persists.

## 2024-09-27 NOTE — PHYSICAL EXAM
[Right] : right knee [Positive] : positive Zayra [Left] : left knee [NL (0)] : extension 0 degrees [4___] : hamstring 4[unfilled]/5 [Equivocal] : equivocal Zayra [] : no erythema [FreeTextEntry3] : well healed surgical scars [TWNoteComboBox7] : flexion 125 degrees

## 2024-11-08 ENCOUNTER — APPOINTMENT (OUTPATIENT)
Dept: ORTHOPEDIC SURGERY | Facility: CLINIC | Age: 54
End: 2024-11-08

## 2024-11-11 ENCOUNTER — NON-APPOINTMENT (OUTPATIENT)
Age: 54
End: 2024-11-11

## 2024-11-25 ENCOUNTER — APPOINTMENT (OUTPATIENT)
Dept: ORTHOPEDIC SURGERY | Facility: CLINIC | Age: 54
End: 2024-11-25

## 2024-12-05 ENCOUNTER — APPOINTMENT (OUTPATIENT)
Dept: ORTHOPEDIC SURGERY | Facility: CLINIC | Age: 54
End: 2024-12-05
Payer: COMMERCIAL

## 2024-12-05 VITALS — HEIGHT: 68 IN | BODY MASS INDEX: 44.71 KG/M2 | WEIGHT: 295 LBS

## 2024-12-05 DIAGNOSIS — M79.651 PAIN IN RIGHT THIGH: ICD-10-CM

## 2024-12-05 PROCEDURE — 72110 X-RAY EXAM L-2 SPINE 4/>VWS: CPT

## 2024-12-05 PROCEDURE — 72170 X-RAY EXAM OF PELVIS: CPT

## 2024-12-05 PROCEDURE — 99212 OFFICE O/P EST SF 10 MIN: CPT

## 2024-12-06 ENCOUNTER — APPOINTMENT (OUTPATIENT)
Dept: MRI IMAGING | Facility: CLINIC | Age: 54
End: 2024-12-06
Payer: COMMERCIAL

## 2024-12-06 DIAGNOSIS — R10.2 PELVIC AND PERINEAL PAIN: ICD-10-CM

## 2024-12-06 PROCEDURE — 72195 MRI PELVIS W/O DYE: CPT
